# Patient Record
Sex: FEMALE | Race: WHITE
[De-identification: names, ages, dates, MRNs, and addresses within clinical notes are randomized per-mention and may not be internally consistent; named-entity substitution may affect disease eponyms.]

---

## 2022-12-06 ENCOUNTER — OFFICE VISIT (OUTPATIENT)
Dept: FAMILY MEDICINE CLINIC | Facility: CLINIC | Age: 32
End: 2022-12-06
Payer: COMMERCIAL

## 2022-12-06 VITALS
OXYGEN SATURATION: 99 % | WEIGHT: 146.2 LBS | BODY MASS INDEX: 26.91 KG/M2 | DIASTOLIC BLOOD PRESSURE: 80 MMHG | HEIGHT: 62 IN | SYSTOLIC BLOOD PRESSURE: 120 MMHG | HEART RATE: 99 BPM

## 2022-12-06 DIAGNOSIS — F41.9 ANXIETY: Primary | ICD-10-CM

## 2022-12-06 DIAGNOSIS — F33.9 RECURRENT DEPRESSION (HCC): ICD-10-CM

## 2022-12-06 PROCEDURE — 99213 OFFICE O/P EST LOW 20 MIN: CPT | Performed by: FAMILY MEDICINE

## 2022-12-06 RX ORDER — NORGESTIMATE AND ETHINYL ESTRADIOL 0.25-0.035
1 KIT ORAL DAILY
COMMUNITY
Start: 2022-04-27

## 2022-12-06 ASSESSMENT — PATIENT HEALTH QUESTIONNAIRE - PHQ9: DEPRESSION UNABLE TO ASSESS: FUNCTIONAL CAPACITY MOTIVATION LIMITS ACCURACY

## 2022-12-06 NOTE — PROGRESS NOTES
SUBJECTIVE:   Rdaha Mayo is a 29 y.o. female who has a past medical history significant for recurrent depression and anxiety. Previous visit in May we weaned her off of her Zoloft. She is doing well off of it. She would like to wean off of her Wellbutrin if possible. Patient is  and very happy. She reports that a lot of her issues that were situational related to her stress and anxiety have resolved. She has a new job and is very happy in this role as well. HPI  See above    Past Medical History, Past Surgical History, Family history, Social History, and Medications were all reviewed with the patient today and updated as necessary. Current Outpatient Medications   Medication Sig Dispense Refill    norgestimate-ethinyl estradiol (ORTHO-CYCLEN) 0.25-35 MG-MCG per tablet Take 1 tablet by mouth daily      buPROPion (WELLBUTRIN XL) 150 MG extended release tablet Take 150 mg by mouth       No current facility-administered medications for this visit. Allergies   Allergen Reactions    Cefprozil Hives    Levofloxacin Hives     There is no problem list on file for this patient. Past Medical History:   Diagnosis Date    Anxiety     Depression     Insomnia      No past surgical history on file. Family History   Problem Relation Age of Onset    Diabetes Father      Social History     Tobacco Use    Smoking status: Never    Smokeless tobacco: Never   Substance Use Topics    Alcohol use: Yes     Alcohol/week: 0.0 standard drinks         Review of Systems  See above    OBJECTIVE:  /80   Pulse 99   Ht 5' 2\" (1.575 m)   Wt 146 lb 3.2 oz (66.3 kg)   SpO2 99%   BMI 26.74 kg/m²      Physical Exam  Constitutional:       General: She is not in acute distress. Appearance: Normal appearance. She is not ill-appearing. HENT:      Head: Normocephalic and atraumatic. Cardiovascular:      Rate and Rhythm: Normal rate and regular rhythm. Heart sounds: Normal heart sounds.  No murmur heard. Pulmonary:      Effort: Pulmonary effort is normal.      Breath sounds: Normal breath sounds. No wheezing or rhonchi. Musculoskeletal:         General: Normal range of motion. Cervical back: Normal range of motion and neck supple. Right lower leg: No edema. Left lower leg: No edema. Skin:     General: Skin is warm. Findings: No rash. Neurological:      Mental Status: She is alert and oriented to person, place, and time. Psychiatric:         Mood and Affect: Mood normal.         Behavior: Behavior normal.         Thought Content: Thought content normal.         Judgment: Judgment normal.       Medical problems and test results were reviewed with the patient today. ASSESSMENT and PLAN    1. Anxiety. Wean off Wellbutrin every other day for a week and every third day for a week and then stop. If she has any issues she will let me know. 2.  Recurrent depression. As above. Patient will monitor for any breakthrough rebound symptoms. Congratulated her on her improvement and she understands that if she needs anything she can reach out anytime. Elements of this note have been dictated using speech recognition software. As a result, errors of speech recognition may have occurred.

## 2023-01-30 RX ORDER — BUPROPION HYDROCHLORIDE 150 MG/1
TABLET ORAL
Qty: 90 TABLET | OUTPATIENT
Start: 2023-01-30

## 2023-10-02 ENCOUNTER — NURSE ONLY (OUTPATIENT)
Dept: FAMILY MEDICINE CLINIC | Facility: CLINIC | Age: 33
End: 2023-10-02
Payer: COMMERCIAL

## 2023-10-02 DIAGNOSIS — Z00.00 LABORATORY EXAMINATION ORDERED AS PART OF A ROUTINE GENERAL MEDICAL EXAMINATION: Primary | ICD-10-CM

## 2023-10-02 DIAGNOSIS — Z11.59 NEED FOR HEPATITIS C SCREENING TEST: ICD-10-CM

## 2023-10-02 LAB
ALBUMIN SERPL-MCNC: 3.6 G/DL (ref 3.5–5)
ALBUMIN/GLOB SERPL: 1.1 (ref 0.4–1.6)
ALP SERPL-CCNC: 43 U/L (ref 50–136)
ALT SERPL-CCNC: 29 U/L (ref 12–65)
ANION GAP SERPL CALC-SCNC: 6 MMOL/L (ref 2–11)
AST SERPL-CCNC: 17 U/L (ref 15–37)
BILIRUB SERPL-MCNC: 0.5 MG/DL (ref 0.2–1.1)
BUN SERPL-MCNC: 10 MG/DL (ref 6–23)
CALCIUM SERPL-MCNC: 8.4 MG/DL (ref 8.3–10.4)
CHLORIDE SERPL-SCNC: 108 MMOL/L (ref 101–110)
CHOLEST SERPL-MCNC: 240 MG/DL
CO2 SERPL-SCNC: 24 MMOL/L (ref 21–32)
CREAT SERPL-MCNC: 0.8 MG/DL (ref 0.6–1)
GLOBULIN SER CALC-MCNC: 3.4 G/DL (ref 2.8–4.5)
GLUCOSE SERPL-MCNC: 95 MG/DL (ref 65–100)
GRANS ABSOLUTE, POC: 3.9 K/UL
GRANULOCYTES %, POC: 61.3 %
HCV AB SER QL: NONREACTIVE
HDLC SERPL-MCNC: 74 MG/DL (ref 40–60)
HDLC SERPL: 3.2
HEMATOCRIT, POC: 42.9 %
HEMOGLOBIN, POC: 13.9 G/DL
LDLC SERPL CALC-MCNC: 142 MG/DL
LYMPHOCYTE %, POC: 30.3 %
LYMPHS ABSOLUTE, POC: 1.9 K/UL
MCH, POC: NORMAL PG (ref 40–?)
MCHC, POC: 32.4
MCV, POC: 96.9
MONOCYTE %, POC: 8.4 %
MONOCYTE, ABSOLUTE POC: 0.5 K/UL
MPV, POC: 7.4 FL
PLATELET COUNT, POC: 319 K/UL
POTASSIUM SERPL-SCNC: 4.4 MMOL/L (ref 3.5–5.1)
PROT SERPL-MCNC: 7 G/DL (ref 6.3–8.2)
RBC, POC: 4.31 M/UL
RDW, POC: 12.7 %
SODIUM SERPL-SCNC: 138 MMOL/L (ref 133–143)
TRIGL SERPL-MCNC: 120 MG/DL (ref 35–150)
TSH, 3RD GENERATION: 1.76 UIU/ML (ref 0.36–3.74)
VLDLC SERPL CALC-MCNC: 24 MG/DL (ref 6–23)
WBC, POC: 6.4 K/UL

## 2023-10-02 PROCEDURE — 85025 COMPLETE CBC W/AUTO DIFF WBC: CPT | Performed by: FAMILY MEDICINE

## 2023-10-02 ASSESSMENT — PATIENT HEALTH QUESTIONNAIRE - PHQ9
7. TROUBLE CONCENTRATING ON THINGS, SUCH AS READING THE NEWSPAPER OR WATCHING TELEVISION: 2
5. POOR APPETITE OR OVEREATING: NOT AT ALL
1. LITTLE INTEREST OR PLEASURE IN DOING THINGS: 0
8. MOVING OR SPEAKING SO SLOWLY THAT OTHER PEOPLE COULD HAVE NOTICED. OR THE OPPOSITE - BEING SO FIDGETY OR RESTLESS THAT YOU HAVE BEEN MOVING AROUND A LOT MORE THAN USUAL: NOT AT ALL
SUM OF ALL RESPONSES TO PHQ QUESTIONS 1-9: 3
4. FEELING TIRED OR HAVING LITTLE ENERGY: SEVERAL DAYS
2. FEELING DOWN, DEPRESSED OR HOPELESS: NOT AT ALL
3. TROUBLE FALLING OR STAYING ASLEEP: NOT AT ALL
5. POOR APPETITE OR OVEREATING: 0
4. FEELING TIRED OR HAVING LITTLE ENERGY: 1
SUM OF ALL RESPONSES TO PHQ QUESTIONS 1-9: 3
9. THOUGHTS THAT YOU WOULD BE BETTER OFF DEAD, OR OF HURTING YOURSELF: 0
1. LITTLE INTEREST OR PLEASURE IN DOING THINGS: NOT AT ALL
10. IF YOU CHECKED OFF ANY PROBLEMS, HOW DIFFICULT HAVE THESE PROBLEMS MADE IT FOR YOU TO DO YOUR WORK, TAKE CARE OF THINGS AT HOME, OR GET ALONG WITH OTHER PEOPLE: SOMEWHAT DIFFICULT
SUM OF ALL RESPONSES TO PHQ QUESTIONS 1-9: 3
SUM OF ALL RESPONSES TO PHQ QUESTIONS 1-9: 3
7. TROUBLE CONCENTRATING ON THINGS, SUCH AS READING THE NEWSPAPER OR WATCHING TELEVISION: MORE THAN HALF THE DAYS
6. FEELING BAD ABOUT YOURSELF - OR THAT YOU ARE A FAILURE OR HAVE LET YOURSELF OR YOUR FAMILY DOWN: 0
SUM OF ALL RESPONSES TO PHQ9 QUESTIONS 1 & 2: 0
3. TROUBLE FALLING OR STAYING ASLEEP: 0
6. FEELING BAD ABOUT YOURSELF - OR THAT YOU ARE A FAILURE OR HAVE LET YOURSELF OR YOUR FAMILY DOWN: NOT AT ALL
9. THOUGHTS THAT YOU WOULD BE BETTER OFF DEAD, OR OF HURTING YOURSELF: NOT AT ALL
8. MOVING OR SPEAKING SO SLOWLY THAT OTHER PEOPLE COULD HAVE NOTICED. OR THE OPPOSITE, BEING SO FIGETY OR RESTLESS THAT YOU HAVE BEEN MOVING AROUND A LOT MORE THAN USUAL: 0
10. IF YOU CHECKED OFF ANY PROBLEMS, HOW DIFFICULT HAVE THESE PROBLEMS MADE IT FOR YOU TO DO YOUR WORK, TAKE CARE OF THINGS AT HOME, OR GET ALONG WITH OTHER PEOPLE: 1
2. FEELING DOWN, DEPRESSED OR HOPELESS: 0
SUM OF ALL RESPONSES TO PHQ QUESTIONS 1-9: 3

## 2023-10-09 ENCOUNTER — OFFICE VISIT (OUTPATIENT)
Dept: FAMILY MEDICINE CLINIC | Facility: CLINIC | Age: 33
End: 2023-10-09
Payer: COMMERCIAL

## 2023-10-09 VITALS
WEIGHT: 148.8 LBS | DIASTOLIC BLOOD PRESSURE: 86 MMHG | OXYGEN SATURATION: 99 % | SYSTOLIC BLOOD PRESSURE: 122 MMHG | BODY MASS INDEX: 27.38 KG/M2 | HEIGHT: 62 IN | HEART RATE: 103 BPM

## 2023-10-09 DIAGNOSIS — E78.00 PURE HYPERCHOLESTEROLEMIA: ICD-10-CM

## 2023-10-09 DIAGNOSIS — Z00.00 ROUTINE GENERAL MEDICAL EXAMINATION AT A HEALTH CARE FACILITY: Primary | ICD-10-CM

## 2023-10-09 DIAGNOSIS — F33.9 RECURRENT DEPRESSION (HCC): ICD-10-CM

## 2023-10-09 DIAGNOSIS — F41.9 ANXIETY: ICD-10-CM

## 2023-10-09 PROCEDURE — 99395 PREV VISIT EST AGE 18-39: CPT | Performed by: FAMILY MEDICINE

## 2023-10-09 RX ORDER — NORETHINDRONE AND ETHINYL ESTRADIOL 0.5-0.035
KIT ORAL
COMMUNITY
Start: 2023-09-16

## 2023-10-09 SDOH — ECONOMIC STABILITY: FOOD INSECURITY: WITHIN THE PAST 12 MONTHS, THE FOOD YOU BOUGHT JUST DIDN'T LAST AND YOU DIDN'T HAVE MONEY TO GET MORE.: NEVER TRUE

## 2023-10-09 SDOH — ECONOMIC STABILITY: HOUSING INSECURITY
IN THE LAST 12 MONTHS, WAS THERE A TIME WHEN YOU DID NOT HAVE A STEADY PLACE TO SLEEP OR SLEPT IN A SHELTER (INCLUDING NOW)?: NO

## 2023-10-09 SDOH — ECONOMIC STABILITY: INCOME INSECURITY: HOW HARD IS IT FOR YOU TO PAY FOR THE VERY BASICS LIKE FOOD, HOUSING, MEDICAL CARE, AND HEATING?: NOT HARD AT ALL

## 2023-10-09 SDOH — ECONOMIC STABILITY: FOOD INSECURITY: WITHIN THE PAST 12 MONTHS, YOU WORRIED THAT YOUR FOOD WOULD RUN OUT BEFORE YOU GOT MONEY TO BUY MORE.: NEVER TRUE

## 2023-10-10 ENCOUNTER — TRANSCRIBE ORDERS (OUTPATIENT)
Dept: SCHEDULING | Age: 33
End: 2023-10-10

## 2023-10-10 DIAGNOSIS — N63.21 UNSPECIFIED LUMP IN THE LEFT BREAST, UPPER OUTER QUADRANT: ICD-10-CM

## 2023-10-10 DIAGNOSIS — N63.10 MASS OF RIGHT BREAST, UNSPECIFIED QUADRANT: Primary | ICD-10-CM

## 2023-10-20 ENCOUNTER — HOSPITAL ENCOUNTER (OUTPATIENT)
Dept: MAMMOGRAPHY | Age: 33
Discharge: HOME | End: 2023-10-20
Payer: COMMERCIAL

## 2023-10-20 ENCOUNTER — HOSPITAL ENCOUNTER (OUTPATIENT)
Dept: MAMMOGRAPHY | Age: 33
End: 2023-10-20
Payer: COMMERCIAL

## 2023-10-20 DIAGNOSIS — N63.21 UNSPECIFIED LUMP IN THE LEFT BREAST, UPPER OUTER QUADRANT: ICD-10-CM

## 2023-10-20 DIAGNOSIS — N63.0 LUMP IN FEMALE BREAST: ICD-10-CM

## 2023-10-20 DIAGNOSIS — N63.10 MASS OF RIGHT BREAST, UNSPECIFIED QUADRANT: ICD-10-CM

## 2023-10-20 PROCEDURE — G0279 TOMOSYNTHESIS, MAMMO: HCPCS

## 2023-10-20 PROCEDURE — 76642 ULTRASOUND BREAST LIMITED: CPT

## 2024-09-15 ENCOUNTER — PREP FOR PROCEDURE (OUTPATIENT)
Dept: OBGYN | Age: 34
End: 2024-09-15

## 2024-09-15 RX ORDER — CARBOPROST TROMETHAMINE 250 UG/ML
250 INJECTION, SOLUTION INTRAMUSCULAR PRN
Status: CANCELLED | OUTPATIENT
Start: 2024-09-15

## 2024-09-15 RX ORDER — TERBUTALINE SULFATE 1 MG/ML
0.25 INJECTION, SOLUTION SUBCUTANEOUS
Status: CANCELLED | OUTPATIENT
Start: 2024-09-15 | End: 2024-09-16

## 2024-09-15 RX ORDER — TRANEXAMIC ACID 10 MG/ML
1000 INJECTION, SOLUTION INTRAVENOUS
Status: CANCELLED | OUTPATIENT
Start: 2024-09-15 | End: 2024-09-16

## 2024-09-15 RX ORDER — SODIUM CHLORIDE, SODIUM LACTATE, POTASSIUM CHLORIDE, CALCIUM CHLORIDE 600; 310; 30; 20 MG/100ML; MG/100ML; MG/100ML; MG/100ML
INJECTION, SOLUTION INTRAVENOUS CONTINUOUS
Status: CANCELLED | OUTPATIENT
Start: 2024-09-15

## 2024-09-15 RX ORDER — SODIUM CHLORIDE, SODIUM LACTATE, POTASSIUM CHLORIDE, AND CALCIUM CHLORIDE .6; .31; .03; .02 G/100ML; G/100ML; G/100ML; G/100ML
500 INJECTION, SOLUTION INTRAVENOUS PRN
Status: CANCELLED | OUTPATIENT
Start: 2024-09-15

## 2024-09-15 RX ORDER — ONDANSETRON 4 MG/1
4 TABLET, ORALLY DISINTEGRATING ORAL EVERY 6 HOURS PRN
Status: CANCELLED | OUTPATIENT
Start: 2024-09-15

## 2024-09-15 RX ORDER — DOCUSATE SODIUM 100 MG/1
100 CAPSULE, LIQUID FILLED ORAL 2 TIMES DAILY
Status: CANCELLED | OUTPATIENT
Start: 2024-09-15

## 2024-09-15 RX ORDER — SODIUM CHLORIDE 9 MG/ML
INJECTION, SOLUTION INTRAVENOUS PRN
Status: CANCELLED | OUTPATIENT
Start: 2024-09-15

## 2024-09-15 RX ORDER — MISOPROSTOL 200 UG/1
400 TABLET ORAL PRN
Status: CANCELLED | OUTPATIENT
Start: 2024-09-15

## 2024-09-15 RX ORDER — SODIUM CHLORIDE 0.9 % (FLUSH) 0.9 %
5-40 SYRINGE (ML) INJECTION PRN
Status: CANCELLED | OUTPATIENT
Start: 2024-09-15

## 2024-09-15 RX ORDER — SODIUM CHLORIDE 0.9 % (FLUSH) 0.9 %
5-40 SYRINGE (ML) INJECTION EVERY 12 HOURS SCHEDULED
Status: CANCELLED | OUTPATIENT
Start: 2024-09-15

## 2024-09-15 RX ORDER — BUTORPHANOL TARTRATE 1 MG/ML
1 INJECTION, SOLUTION INTRAMUSCULAR; INTRAVENOUS
Status: CANCELLED | OUTPATIENT
Start: 2024-09-15

## 2024-09-15 RX ORDER — SODIUM CHLORIDE, SODIUM LACTATE, POTASSIUM CHLORIDE, AND CALCIUM CHLORIDE .6; .31; .03; .02 G/100ML; G/100ML; G/100ML; G/100ML
1000 INJECTION, SOLUTION INTRAVENOUS PRN
Status: CANCELLED | OUTPATIENT
Start: 2024-09-15

## 2024-09-15 RX ORDER — ONDANSETRON 2 MG/ML
4 INJECTION INTRAMUSCULAR; INTRAVENOUS EVERY 6 HOURS PRN
Status: CANCELLED | OUTPATIENT
Start: 2024-09-15

## 2024-09-15 RX ORDER — METHYLERGONOVINE MALEATE 0.2 MG/ML
200 INJECTION INTRAVENOUS PRN
Status: CANCELLED | OUTPATIENT
Start: 2024-09-15

## 2024-10-01 ENCOUNTER — HOSPITAL ENCOUNTER (INPATIENT)
Age: 34
LOS: 3 days | Discharge: HOME | End: 2024-10-04
Attending: OBSTETRICS & GYNECOLOGY | Admitting: OBSTETRICS & GYNECOLOGY
Payer: COMMERCIAL

## 2024-10-01 DIAGNOSIS — Z3A.39 39 WEEKS GESTATION OF PREGNANCY: Primary | ICD-10-CM

## 2024-10-01 LAB
ABO + RH BLD: NORMAL
BLOOD GROUP ANTIBODIES SERPL: NORMAL
ERYTHROCYTE [DISTWIDTH] IN BLOOD BY AUTOMATED COUNT: 13.6 % (ref 11.9–14.6)
HCT VFR BLD AUTO: 38.2 % (ref 35.8–46.3)
HGB BLD-MCNC: 12.4 G/DL (ref 11.7–15.4)
MCH RBC QN AUTO: 30.5 PG (ref 26.1–32.9)
MCHC RBC AUTO-ENTMCNC: 32.5 G/DL (ref 31.4–35)
MCV RBC AUTO: 94.1 FL (ref 82–102)
NRBC # BLD: 0 K/UL (ref 0–0.2)
PLATELET # BLD AUTO: 176 K/UL (ref 150–450)
PMV BLD AUTO: 11 FL (ref 9.4–12.3)
RBC # BLD AUTO: 4.06 M/UL (ref 4.05–5.2)
SPECIMEN EXP DATE BLD: NORMAL
WBC # BLD AUTO: 8.1 K/UL (ref 4.3–11.1)

## 2024-10-01 PROCEDURE — 36415 COLL VENOUS BLD VENIPUNCTURE: CPT

## 2024-10-01 PROCEDURE — 85027 COMPLETE CBC AUTOMATED: CPT

## 2024-10-01 PROCEDURE — 6370000000 HC RX 637 (ALT 250 FOR IP): Performed by: OBSTETRICS & GYNECOLOGY

## 2024-10-01 PROCEDURE — 86901 BLOOD TYPING SEROLOGIC RH(D): CPT

## 2024-10-01 PROCEDURE — 86780 TREPONEMA PALLIDUM: CPT

## 2024-10-01 PROCEDURE — 86900 BLOOD TYPING SEROLOGIC ABO: CPT

## 2024-10-01 PROCEDURE — 1100000000 HC RM PRIVATE

## 2024-10-01 PROCEDURE — 86850 RBC ANTIBODY SCREEN: CPT

## 2024-10-01 PROCEDURE — 59200 INSERT CERVICAL DILATOR: CPT

## 2024-10-01 RX ORDER — ONDANSETRON 2 MG/ML
4 INJECTION INTRAMUSCULAR; INTRAVENOUS EVERY 6 HOURS PRN
Status: DISCONTINUED | OUTPATIENT
Start: 2024-10-01 | End: 2024-10-02

## 2024-10-01 RX ORDER — SODIUM CHLORIDE 0.9 % (FLUSH) 0.9 %
5-40 SYRINGE (ML) INJECTION PRN
Status: DISCONTINUED | OUTPATIENT
Start: 2024-10-01 | End: 2024-10-02

## 2024-10-01 RX ORDER — ONDANSETRON 4 MG/1
4 TABLET, ORALLY DISINTEGRATING ORAL EVERY 6 HOURS PRN
Status: DISCONTINUED | OUTPATIENT
Start: 2024-10-01 | End: 2024-10-02

## 2024-10-01 RX ORDER — METHYLERGONOVINE MALEATE 0.2 MG/ML
200 INJECTION INTRAVENOUS PRN
Status: DISCONTINUED | OUTPATIENT
Start: 2024-10-01 | End: 2024-10-02

## 2024-10-01 RX ORDER — SODIUM CHLORIDE, SODIUM LACTATE, POTASSIUM CHLORIDE, AND CALCIUM CHLORIDE .6; .31; .03; .02 G/100ML; G/100ML; G/100ML; G/100ML
500 INJECTION, SOLUTION INTRAVENOUS PRN
Status: DISCONTINUED | OUTPATIENT
Start: 2024-10-01 | End: 2024-10-02

## 2024-10-01 RX ORDER — TERBUTALINE SULFATE 1 MG/ML
0.25 INJECTION, SOLUTION SUBCUTANEOUS
Status: DISCONTINUED | OUTPATIENT
Start: 2024-10-01 | End: 2024-10-02

## 2024-10-01 RX ORDER — DOCUSATE SODIUM 100 MG/1
100 CAPSULE, LIQUID FILLED ORAL 2 TIMES DAILY
Status: DISCONTINUED | OUTPATIENT
Start: 2024-10-01 | End: 2024-10-02

## 2024-10-01 RX ORDER — CARBOPROST TROMETHAMINE 250 UG/ML
250 INJECTION, SOLUTION INTRAMUSCULAR PRN
Status: DISCONTINUED | OUTPATIENT
Start: 2024-10-01 | End: 2024-10-02

## 2024-10-01 RX ORDER — SODIUM CHLORIDE, SODIUM LACTATE, POTASSIUM CHLORIDE, CALCIUM CHLORIDE 600; 310; 30; 20 MG/100ML; MG/100ML; MG/100ML; MG/100ML
INJECTION, SOLUTION INTRAVENOUS CONTINUOUS
Status: DISCONTINUED | OUTPATIENT
Start: 2024-10-01 | End: 2024-10-02

## 2024-10-01 RX ORDER — SODIUM CHLORIDE 9 MG/ML
INJECTION, SOLUTION INTRAVENOUS PRN
Status: DISCONTINUED | OUTPATIENT
Start: 2024-10-01 | End: 2024-10-02

## 2024-10-01 RX ORDER — SODIUM CHLORIDE 0.9 % (FLUSH) 0.9 %
5-40 SYRINGE (ML) INJECTION EVERY 12 HOURS SCHEDULED
Status: DISCONTINUED | OUTPATIENT
Start: 2024-10-01 | End: 2024-10-02

## 2024-10-01 RX ORDER — TRANEXAMIC ACID 10 MG/ML
1000 INJECTION, SOLUTION INTRAVENOUS
Status: DISCONTINUED | OUTPATIENT
Start: 2024-10-01 | End: 2024-10-02

## 2024-10-01 RX ORDER — SODIUM CHLORIDE, SODIUM LACTATE, POTASSIUM CHLORIDE, AND CALCIUM CHLORIDE .6; .31; .03; .02 G/100ML; G/100ML; G/100ML; G/100ML
1000 INJECTION, SOLUTION INTRAVENOUS PRN
Status: DISCONTINUED | OUTPATIENT
Start: 2024-10-01 | End: 2024-10-02

## 2024-10-01 RX ADMIN — Medication 50 MCG: at 20:41

## 2024-10-02 ENCOUNTER — ANESTHESIA (OUTPATIENT)
Dept: LABOR AND DELIVERY | Age: 34
End: 2024-10-02
Payer: COMMERCIAL

## 2024-10-02 ENCOUNTER — ANESTHESIA EVENT (OUTPATIENT)
Dept: LABOR AND DELIVERY | Age: 34
End: 2024-10-02
Payer: COMMERCIAL

## 2024-10-02 PROCEDURE — 0KQM0ZZ REPAIR PERINEUM MUSCLE, OPEN APPROACH: ICD-10-PCS | Performed by: OBSTETRICS & GYNECOLOGY

## 2024-10-02 PROCEDURE — 10907ZC DRAINAGE OF AMNIOTIC FLUID, THERAPEUTIC FROM PRODUCTS OF CONCEPTION, VIA NATURAL OR ARTIFICIAL OPENING: ICD-10-PCS | Performed by: OBSTETRICS & GYNECOLOGY

## 2024-10-02 PROCEDURE — 62325 NJX INTERLAMINAR CRV/THRC: CPT | Performed by: ANESTHESIOLOGY

## 2024-10-02 PROCEDURE — 7210000100 HC LABOR FEE PER 1 HR

## 2024-10-02 PROCEDURE — 7220000101 HC DELIVERY VAGINAL/SINGLE

## 2024-10-02 PROCEDURE — 00HU33Z INSERTION OF INFUSION DEVICE INTO SPINAL CANAL, PERCUTANEOUS APPROACH: ICD-10-PCS | Performed by: ANESTHESIOLOGY

## 2024-10-02 PROCEDURE — 2580000003 HC RX 258: Performed by: OBSTETRICS & GYNECOLOGY

## 2024-10-02 PROCEDURE — 7100000011 HC PHASE II RECOVERY - ADDTL 15 MIN

## 2024-10-02 PROCEDURE — 6370000000 HC RX 637 (ALT 250 FOR IP): Performed by: OBSTETRICS & GYNECOLOGY

## 2024-10-02 PROCEDURE — 51701 INSERT BLADDER CATHETER: CPT

## 2024-10-02 PROCEDURE — 6360000002 HC RX W HCPCS: Performed by: OBSTETRICS & GYNECOLOGY

## 2024-10-02 PROCEDURE — 7100000010 HC PHASE II RECOVERY - FIRST 15 MIN

## 2024-10-02 PROCEDURE — 1100000000 HC RM PRIVATE

## 2024-10-02 PROCEDURE — 0UQMXZZ REPAIR VULVA, EXTERNAL APPROACH: ICD-10-PCS | Performed by: OBSTETRICS & GYNECOLOGY

## 2024-10-02 PROCEDURE — 3E033VJ INTRODUCTION OF OTHER HORMONE INTO PERIPHERAL VEIN, PERCUTANEOUS APPROACH: ICD-10-PCS | Performed by: OBSTETRICS & GYNECOLOGY

## 2024-10-02 RX ORDER — ROPIVACAINE HYDROCHLORIDE 2 MG/ML
INJECTION, SOLUTION EPIDURAL; INFILTRATION; PERINEURAL
Status: DISCONTINUED | OUTPATIENT
Start: 2024-10-02 | End: 2024-10-02 | Stop reason: SDUPTHER

## 2024-10-02 RX ORDER — OXYCODONE HYDROCHLORIDE 5 MG/1
10 TABLET ORAL EVERY 4 HOURS PRN
Status: DISCONTINUED | OUTPATIENT
Start: 2024-10-02 | End: 2024-10-04 | Stop reason: HOSPADM

## 2024-10-02 RX ORDER — IBUPROFEN 800 MG/1
800 TABLET, FILM COATED ORAL EVERY 6 HOURS
Status: DISCONTINUED | OUTPATIENT
Start: 2024-10-02 | End: 2024-10-04 | Stop reason: HOSPADM

## 2024-10-02 RX ORDER — DOCUSATE SODIUM 100 MG/1
100 CAPSULE, LIQUID FILLED ORAL 2 TIMES DAILY PRN
Status: DISCONTINUED | OUTPATIENT
Start: 2024-10-02 | End: 2024-10-04 | Stop reason: HOSPADM

## 2024-10-02 RX ORDER — OXYCODONE HYDROCHLORIDE 5 MG/1
5 TABLET ORAL EVERY 4 HOURS PRN
Status: DISCONTINUED | OUTPATIENT
Start: 2024-10-02 | End: 2024-10-04 | Stop reason: HOSPADM

## 2024-10-02 RX ORDER — SODIUM CHLORIDE 9 MG/ML
INJECTION, SOLUTION INTRAVENOUS PRN
Status: DISCONTINUED | OUTPATIENT
Start: 2024-10-02 | End: 2024-10-03

## 2024-10-02 RX ORDER — SIMETHICONE 80 MG
80 TABLET,CHEWABLE ORAL EVERY 6 HOURS PRN
Status: DISCONTINUED | OUTPATIENT
Start: 2024-10-02 | End: 2024-10-04 | Stop reason: HOSPADM

## 2024-10-02 RX ORDER — SODIUM CHLORIDE 0.9 % (FLUSH) 0.9 %
5-40 SYRINGE (ML) INJECTION EVERY 12 HOURS SCHEDULED
Status: DISCONTINUED | OUTPATIENT
Start: 2024-10-02 | End: 2024-10-03

## 2024-10-02 RX ORDER — FAMOTIDINE 20 MG/1
20 TABLET, FILM COATED ORAL 2 TIMES DAILY
Status: DISCONTINUED | OUTPATIENT
Start: 2024-10-02 | End: 2024-10-04 | Stop reason: HOSPADM

## 2024-10-02 RX ORDER — ACETAMINOPHEN 500 MG
1000 TABLET ORAL EVERY 6 HOURS
Status: DISCONTINUED | OUTPATIENT
Start: 2024-10-02 | End: 2024-10-04 | Stop reason: HOSPADM

## 2024-10-02 RX ORDER — ONDANSETRON 4 MG/1
4 TABLET, ORALLY DISINTEGRATING ORAL EVERY 6 HOURS PRN
Status: DISCONTINUED | OUTPATIENT
Start: 2024-10-02 | End: 2024-10-04 | Stop reason: HOSPADM

## 2024-10-02 RX ORDER — SODIUM CHLORIDE 0.9 % (FLUSH) 0.9 %
5-40 SYRINGE (ML) INJECTION PRN
Status: DISCONTINUED | OUTPATIENT
Start: 2024-10-02 | End: 2024-10-03

## 2024-10-02 RX ORDER — LANOLIN
CREAM (ML) TOPICAL PRN
Status: DISCONTINUED | OUTPATIENT
Start: 2024-10-02 | End: 2024-10-04 | Stop reason: HOSPADM

## 2024-10-02 RX ORDER — POLYETHYLENE GLYCOL 3350 17 G/17G
17 POWDER, FOR SOLUTION ORAL DAILY
Status: DISCONTINUED | OUTPATIENT
Start: 2024-10-02 | End: 2024-10-04 | Stop reason: HOSPADM

## 2024-10-02 RX ORDER — SODIUM CHLORIDE, SODIUM LACTATE, POTASSIUM CHLORIDE, CALCIUM CHLORIDE 600; 310; 30; 20 MG/100ML; MG/100ML; MG/100ML; MG/100ML
INJECTION, SOLUTION INTRAVENOUS CONTINUOUS
Status: DISCONTINUED | OUTPATIENT
Start: 2024-10-02 | End: 2024-10-03

## 2024-10-02 RX ORDER — ONDANSETRON 2 MG/ML
4 INJECTION INTRAMUSCULAR; INTRAVENOUS EVERY 6 HOURS PRN
Status: DISCONTINUED | OUTPATIENT
Start: 2024-10-02 | End: 2024-10-03

## 2024-10-02 RX ORDER — MAGNESIUM CARB/ALUMINUM HYDROX 105-160MG
120 TABLET,CHEWABLE ORAL DAILY PRN
Status: DISCONTINUED | OUTPATIENT
Start: 2024-10-02 | End: 2024-10-02

## 2024-10-02 RX ADMIN — OXYTOCIN 87.3 MILLI-UNITS/MIN: 10 INJECTION, SOLUTION INTRAMUSCULAR; INTRAVENOUS at 17:08

## 2024-10-02 RX ADMIN — Medication: at 19:56

## 2024-10-02 RX ADMIN — SODIUM CHLORIDE, POTASSIUM CHLORIDE, SODIUM LACTATE AND CALCIUM CHLORIDE 1000 ML: 600; 310; 30; 20 INJECTION, SOLUTION INTRAVENOUS at 12:00

## 2024-10-02 RX ADMIN — POLYETHYLENE GLYCOL 3350 17 G: 17 POWDER, FOR SOLUTION ORAL at 19:55

## 2024-10-02 RX ADMIN — ROPIVACAINE HYDROCHLORIDE 8 ML/HR: 2 INJECTION, SOLUTION EPIDURAL; INFILTRATION; PERINEURAL at 12:20

## 2024-10-02 RX ADMIN — ACETAMINOPHEN 1000 MG: 500 TABLET, FILM COATED ORAL at 19:55

## 2024-10-02 RX ADMIN — OXYTOCIN 2 MILLI-UNITS/MIN: 10 INJECTION, SOLUTION INTRAMUSCULAR; INTRAVENOUS at 08:43

## 2024-10-02 RX ADMIN — ONDANSETRON 4 MG: 2 INJECTION, SOLUTION INTRAMUSCULAR; INTRAVENOUS at 10:58

## 2024-10-02 RX ADMIN — WITCH HAZEL: 500 SOLUTION RECTAL; TOPICAL at 19:56

## 2024-10-02 RX ADMIN — SODIUM CHLORIDE, POTASSIUM CHLORIDE, SODIUM LACTATE AND CALCIUM CHLORIDE: 600; 310; 30; 20 INJECTION, SOLUTION INTRAVENOUS at 08:33

## 2024-10-02 RX ADMIN — IBUPROFEN 800 MG: 800 TABLET, FILM COATED ORAL at 23:06

## 2024-10-02 RX ADMIN — Medication 50 MCG: at 00:51

## 2024-10-02 RX ADMIN — ROPIVACAINE HYDROCHLORIDE 4 ML: 2 INJECTION, SOLUTION EPIDURAL; INFILTRATION; PERINEURAL at 12:17

## 2024-10-02 RX ADMIN — SODIUM CHLORIDE, POTASSIUM CHLORIDE, SODIUM LACTATE AND CALCIUM CHLORIDE: 600; 310; 30; 20 INJECTION, SOLUTION INTRAVENOUS at 16:32

## 2024-10-02 RX ADMIN — Medication 120 ML: at 16:02

## 2024-10-02 RX ADMIN — FAMOTIDINE 20 MG: 20 TABLET, FILM COATED ORAL at 19:55

## 2024-10-02 RX ADMIN — ROPIVACAINE HYDROCHLORIDE 4 ML: 2 INJECTION, SOLUTION EPIDURAL; INFILTRATION; PERINEURAL at 12:18

## 2024-10-02 RX ADMIN — Medication 50 MCG: at 04:42

## 2024-10-02 RX ADMIN — SODIUM CHLORIDE, PRESERVATIVE FREE 10 ML: 5 INJECTION INTRAVENOUS at 21:44

## 2024-10-02 ASSESSMENT — PAIN SCALES - GENERAL: PAINLEVEL_OUTOF10: 2

## 2024-10-02 NOTE — L&D DELIVERY NOTE
Samir Fernandes April [[id number]]      Labor Events     Labor: No   Steroids: None  Cervical Ripening Date/Time:      Antibiotics Received during Labor: No  Rupture Date/Time:  10/2/24 07:11:00   Rupture Type: AROM  Fluid Color: Clear  Fluid Odor: None  Induction: Misoprostol  Augmentation: AROM       Anesthesia    Method: Epidural       Labor Event Times      Labor onset date/time:        Dilation complete date/time:   15:35:00     Start pushing date/time:  10/2/2024 15:40:00   Decision date/time (emergent ):            Delivery Details      Delivery Date: 10/2/24 Delivery Time: 17:05:00   Delivery Type: Vaginal, Spontaneous              [place] Presentation    Presentation: Vertex  Position: Left  _: Occiput  _: Anterior       Shoulder Dystocia    Shoulder Dystocia Present?: No       Assisted Delivery Details    Forceps Attempted?: No  Vacuum Extractor Attempted?: No                           Cord    Vessels: 3 Vessels  Complications: Nuchal Loose  Cord Around: Head  Delayed Cord Clamping?: Yes  Cord Blood Disposition: Lab  Gases Sent?: No   Cord Comments:  PROCEDURAL - OBSTETRIC - CORD OBSERVATION   nuchal X1             Placenta    Date/Time: 10/2/2024 17:09:00  Removal: Spontaneous  Appearance: Intact  Disposition: Discarded       Lacerations    Episiotomy: None  Perineal Lacerations: 2nd  Other Lacerations: periurethral laceration  Periurethral Laceration: Midline Repaired?: Yes   Number of Repair Packets: 2       Vaginal Counts    Initial Count Personnel: KAYLEE  Initial Count Verified By: NAI  Intial Sponge Count: Correct Intial Needles Count: Correct Intial Instruments Count: Correct   Final Sponges Count: Correct Final Needles  Count: Correct Final Instruments Count: Correct   Final Count Personnel: KAYLEE  Final Count Verified By: NAI  Accurate Final Count?: Yes       Blood Loss  Mother: Radha Fernandes [facility] #[id number]     Start of Mother's Information      Delivery  Dom  SEX: female  DATE AND TIME OF BIRTH:   Information for the patient's :  Samir Fernandes April [[id number]]   10/2/2022 at   Information for the patient's :  Samir Fernandes April [[id number]]   [birth year]  BIRTH MEASUREMENTS:   Information for the patient's :  Samir Fernandes April [[id number]]     and   Information for the patient's :  Samir Fernandes April [[id number]]    .  APGARS:  Information for the patient's :  Samir Fernaneds April [[id number]]       Information for the patient's :  Samir Fernandes April [[id number]]        Umbilical Cord: Nuchal Cord x  1,left arm around neck also, 3 vessels present, and Cord blood sent to lab for type, Rh, and Wilma' test    Specimens: na           Complications:  none           Lab Results   Component Value Date/Time    ABORH O POSITIVE 10/01/2024 08:58 PM            Attending Attestation: I was present and scrubbed for the entire procedure.

## 2024-10-02 NOTE — PROGRESS NOTES
Pt ambulated to restroom. Pt unable to void at this time. Pt instructed in pericare and demonstrated understanding.

## 2024-10-02 NOTE — ANESTHESIA PROCEDURE NOTES
Epidural Block    Patient location during procedure: OB  Start time: 10/2/2024 12:09 PM  End time: 10/2/2024 12:15 PM  Reason for block: labor epidural and at surgeon's request  Staffing  Performed: anesthesiologist   Anesthesiologist: Ever Lyons MD  Performed by: Ever Lyons MD  Authorized by: Ever Lyons MD    Epidural  Patient position: sitting  Prep: ChloraPrep  Patient monitoring: frequent blood pressure checks  Approach: midline  Location: L3-4  Injection technique: DARREN saline  Provider prep: mask and sterile gloves  Needle  Needle type: Tuohy   Needle gauge: 17 G  Needle insertion depth: 5 cm  Catheter type: multi-orifice  Catheter size: 19 G  Catheter at skin depth: 8.5 cm  Test dose: negativeCatheter Secured: tegaderm and tape  Assessment  Hemodynamics: stable  Attempts: 1  Outcomes: patient tolerated procedure well and uncomplicated  Preanesthetic Checklist  Completed: patient identified, IV checked, site marked, risks and benefits discussed, surgical/procedural consents, equipment checked, pre-op evaluation, timeout performed, anesthesia consent given, oxygen available and monitors applied/VS acknowledged

## 2024-10-02 NOTE — ANESTHESIA PRE PROCEDURE
mg  0.25 mg SubCUTAneous Once PRN Naina Dunn MD       • butorphanol (STADOL) injection 1 mg  1 mg IntraVENous Q3H PRN Naina Dunn MD       • docusate sodium (COLACE) capsule 100 mg  100 mg Oral BID Naina Dunn MD       • oxytocin (PITOCIN) 30 units in 500 mL infusion  1-32 barrie-units/min IntraVENous Continuous Naina Dunn MD 14 mL/hr at 10/02/24 1153 14 barrie-units/min at 10/02/24 1153   • ondansetron (ZOFRAN) injection 4 mg  4 mg IntraVENous Q6H PRN Naina Dunn MD   4 mg at 10/02/24 1058    Or   • ondansetron (ZOFRAN-ODT) disintegrating tablet 4 mg  4 mg Oral Q6H PRN Naina Dunn MD         Facility-Administered Medications Ordered in Other Encounters   Medication Dose Route Frequency Provider Last Rate Last Admin   • ROPivacaine (NAROPIN) 0.2% injection 0.2%   Epidural Once PRN Sena Marin APRN - CRNA   8 mL/hr at 10/02/24 1220       Allergies:    Allergies   Allergen Reactions   • Cefprozil Hives     Pt states not allergy   • Levofloxacin Hives     Pt states not allergy       Problem List:    Patient Active Problem List   Diagnosis Code   • 39 weeks gestation of pregnancy Z3A.39       Past Medical History:        Diagnosis Date   • Anxiety    • Depression    • Insomnia        Past Surgical History:        Procedure Laterality Date   • FOOT SURGERY  08/2020   • TONSILLECTOMY AND ADENOIDECTOMY      17yo   • WISDOM TOOTH EXTRACTION      18yo       Social History:    Social History     Tobacco Use   • Smoking status: Never   • Smokeless tobacco: Never   Substance Use Topics   • Alcohol use: Yes     Alcohol/week: 0.0 standard drinks of alcohol                                Counseling given: Not Answered      Vital Signs (Current):   Vitals:    10/02/24 1015 10/02/24 1016 10/02/24 1107 10/02/24 1110   BP:  109/79  115/69   Pulse: 86 83 68 65   Resp:  17  18   Temp:  98.4 °F (36.9 °C)  98.6 °F (37 °C)   TempSrc:       SpO2: 95% 99% 99% 99%                                              BP

## 2024-10-02 NOTE — H&P
History & Physical    Name: Radha Fernandes  MRN: [id number]   SSN: [unfilled]     YOB: 1990   Age: 34 y.o.  Sex: female       Subjective:     Estimated Date of Delivery: Estimated Date of Delivery: 24     OB History    Para Term  AB Living   1             SAB IAB Ectopic Molar Multiple Live Births                    # Outcome Date GA Lbr Charlie/2nd Weight Sex Type Anes PTL Lv   1 Current                 April Amarilis Fernandes is admitted with pregnancy at 40w2d for induction of labor due to post dates. Prenatal course was normal.  Please see prenatal records for details.    Past Medical History:   Diagnosis Date    Anxiety     Depression     Insomnia       Social History     Socioeconomic History    Marital status:    Tobacco Use    Smoking status: Never    Smokeless tobacco: Never   Substance and Sexual Activity    Alcohol use: Yes     Alcohol/week: 0.0 standard drinks of alcohol     Social Determinants of Health     Financial Resource Strain: Low Risk  (10/9/2023)    Overall Financial Resource Strain (CARDIA)     Difficulty of Paying Living Expenses: Not hard at all   Food Insecurity: No Food Insecurity (10/1/2024)    Hunger Vital Sign     Worried About Running Out of Food in the Last Year: Never true     Ran Out of Food in the Last Year: Never true   Transportation Needs: No Transportation Needs (10/1/2024)    PRAPARE - Transportation     Lack of Transportation (Medical): No     Lack of Transportation (Non-Medical): No   Social Connections: Unknown (2023)    Received from [facility]    Social Connections     Frequency of Communication with Friends and Family: Not asked     Frequency of Social Gatherings with Friends and Family: Not asked   Intimate Partner Violence: Unknown (2023)    Received from [facility]    Intimate Partner Violence     Fear of Current or Ex-Partner: Not asked     Emotionally Abused: Not asked      Physically Abused: Not asked     Sexually Abused: Not asked   Housing Stability: Low Risk  (10/1/2024)    Housing Stability Vital Sign     Unable to Pay for Housing in the Last Year: No     Number of Times Moved in the Last Year: 1     Homeless in the Last Year: No      Allergies   Allergen Reactions    Cefprozil Hives     Pt states not allergy    Levofloxacin Hives     Pt states not allergy        History reviewed. No pertinent family history.    No current facility-administered medications on file prior to encounter.     Current Outpatient Medications on File Prior to Encounter   Medication Sig Dispense Refill    Prenatal MV-Min-Fe Fum-FA-DHA (PRENATAL 1 PO) Take 1 capsule by mouth daily      NECON 0.5/35, 28, 0.5-35 MG-MCG per tablet            Review of Systems: A comprehensive review of systems was negative except for that written in the History of Present Illness.    Objective:     Vitals:    Patient Vitals for the past 24 hrs:   BP Temp Temp src Pulse Resp SpO2   10/02/24 0440 107/64 -- -- 76 -- --   10/02/24 0047 120/74 -- -- 86 -- 98 %   10/01/24 1941 127/87 98.1 °F (36.7 °C) Oral 98 17 98 %         Physical Exam:  AFVSS  Lungs-CTAB  CV-RRR  ABD-soft, gravid    Membranes:  Intact  Fetal Heart Rate: Reactive          Prenatal Labs:   No results found for: \"LABABO\"       Impression/Plan:   39 weeks gestation of pregnancy   Hospital Problems             Last Modified POA    * (Principal) 39 weeks gestation of pregnancy 10/1/2024 Yes        Plan: Admit for induction of labor. .

## 2024-10-02 NOTE — PROGRESS NOTES
EPIDURAL PLACEMENT      Dr Lyons at bedside at 1205.  LISSETTE Linder at bedside at 1206    Assisted pt to sitting up on bedside at 1208.    Timeout completed at 1212 with LISSETTE ROSENTHAL and myself at bedside.    Test dose given at 1216.  Negative reaction.    Dose given at 1217.    Pt assisted to lying back in left tilt position @ 1220.    See anesthesia record for details.  See vital sign flow sheet for BP.    Tolerated procedure well.     Dr Lyons left room 1220  Kim left room 1221

## 2024-10-03 LAB
C. TRACHOMATIS, EXTERNAL RESULT: NOT DETECTED
GBS, EXTERNAL RESULT: NOT DETECTED
HEP B, EXTERNAL RESULT: NON REACTIVE
HEPATITIS C ANTIBODY, EXTERNAL RESULT: NEGATIVE
HIV, EXTERNAL RESULT: NON REACTIVE
N. GONORRHOEAE, EXTERNAL RESULT: NOT DETECTED
RPR, EXTERNAL RESULT: NON REACTIVE
RUBELLA TITER, EXTERNAL RESULT: NORMAL
RUBV IGG SERPL IA-ACNC: 141 IU/ML
T PALLIDUM AB SER QL IA: NONREACTIVE

## 2024-10-03 PROCEDURE — 6370000000 HC RX 637 (ALT 250 FOR IP): Performed by: OBSTETRICS & GYNECOLOGY

## 2024-10-03 PROCEDURE — 36415 COLL VENOUS BLD VENIPUNCTURE: CPT

## 2024-10-03 PROCEDURE — 86762 RUBELLA ANTIBODY: CPT

## 2024-10-03 PROCEDURE — 1100000000 HC RM PRIVATE

## 2024-10-03 RX ADMIN — FAMOTIDINE 20 MG: 20 TABLET, FILM COATED ORAL at 08:17

## 2024-10-03 RX ADMIN — IBUPROFEN 800 MG: 800 TABLET, FILM COATED ORAL at 23:41

## 2024-10-03 RX ADMIN — ACETAMINOPHEN 1000 MG: 500 TABLET, FILM COATED ORAL at 14:35

## 2024-10-03 RX ADMIN — IBUPROFEN 800 MG: 800 TABLET, FILM COATED ORAL at 04:55

## 2024-10-03 RX ADMIN — POLYETHYLENE GLYCOL 3350 17 G: 17 POWDER, FOR SOLUTION ORAL at 08:16

## 2024-10-03 RX ADMIN — IBUPROFEN 800 MG: 800 TABLET, FILM COATED ORAL at 11:21

## 2024-10-03 RX ADMIN — ACETAMINOPHEN 1000 MG: 500 TABLET, FILM COATED ORAL at 19:43

## 2024-10-03 RX ADMIN — FAMOTIDINE 20 MG: 20 TABLET, FILM COATED ORAL at 19:41

## 2024-10-03 RX ADMIN — ACETAMINOPHEN 1000 MG: 500 TABLET, FILM COATED ORAL at 08:17

## 2024-10-03 RX ADMIN — IBUPROFEN 800 MG: 800 TABLET, FILM COATED ORAL at 17:44

## 2024-10-03 RX ADMIN — ACETAMINOPHEN 1000 MG: 500 TABLET, FILM COATED ORAL at 02:09

## 2024-10-03 NOTE — CARE COORDINATION
Chart reviewed - first time parent.  SW met with patient to complete initial assessment.     provided education on [facility] [place] [facility].  Family was undecided on need for home visit.  No referral will be made at this time.  Family has this 's contact information should they decide to participate in program.    Patient given informational packet on  mood & anxiety disorders (resources/education).    Family denies any additional needs from  at this time.  Family has 's contact information should any needs/questions arise.    Darlene Scott, ELLIE-CP, [facility]  [facility] [place]   [phone]

## 2024-10-03 NOTE — ANESTHESIA POSTPROCEDURE EVALUATION
Department of Anesthesiology  Postprocedure Note    Patient: Radha Fernandes  MRN: [id number]  YOB: 1990  Date of evaluation: 10/3/2024    Procedure Summary       Date: 10/02/24 Room / Location:     Anesthesia Start: 1207 Anesthesia Stop: 1705    Procedure: Labor Analgesia Diagnosis:     Scheduled Providers:  Responsible Provider: Ever Lyons MD    Anesthesia Type: epidural ASA Status: 2            Anesthesia Type: No value filed.    Kali Phase I:      Kali Phase II:      Anesthesia Post Evaluation    Patient location during evaluation: floor  Patient participation: complete - patient participated  Level of consciousness: awake and alert  Airway patency: patent  Nausea: well controlled.  Cardiovascular status: acceptable.  Respiratory status: acceptable  Hydration status: stable  Pain management: adequate    No notable events documented.

## 2024-10-03 NOTE — PROGRESS NOTES
Patient up to bathroom with RN assistance.  Lizabeth-care taught and completed. Questions encouraged and answered.  Patient ambulating without difficulty, encouraged to call for needs or concerns. Verbalizes understanding.

## 2024-10-04 ENCOUNTER — LACTATION ENCOUNTER (OUTPATIENT)
Dept: MOTHER INFANT UNIT | Age: 34
End: 2024-10-04

## 2024-10-04 VITALS
HEART RATE: 72 BPM | TEMPERATURE: 97.9 F | SYSTOLIC BLOOD PRESSURE: 108 MMHG | OXYGEN SATURATION: 97 % | DIASTOLIC BLOOD PRESSURE: 69 MMHG | RESPIRATION RATE: 16 BRPM

## 2024-10-04 PROCEDURE — 6370000000 HC RX 637 (ALT 250 FOR IP): Performed by: OBSTETRICS & GYNECOLOGY

## 2024-10-04 RX ORDER — IBUPROFEN 800 MG/1
800 TABLET, FILM COATED ORAL EVERY 6 HOURS
Qty: 120 TABLET | Refills: 3 | Status: SHIPPED | OUTPATIENT
Start: 2024-10-04

## 2024-10-04 RX ORDER — OXYCODONE HYDROCHLORIDE 5 MG/1
5 TABLET ORAL EVERY 4 HOURS PRN
Qty: 20 TABLET | Refills: 0 | Status: SHIPPED | OUTPATIENT
Start: 2024-10-04 | End: 2024-10-07

## 2024-10-04 RX ADMIN — IBUPROFEN 800 MG: 800 TABLET, FILM COATED ORAL at 12:42

## 2024-10-04 RX ADMIN — ACETAMINOPHEN 1000 MG: 500 TABLET, FILM COATED ORAL at 02:12

## 2024-10-04 RX ADMIN — ACETAMINOPHEN 1000 MG: 500 TABLET, FILM COATED ORAL at 08:48

## 2024-10-04 RX ADMIN — IBUPROFEN 800 MG: 800 TABLET, FILM COATED ORAL at 06:15

## 2024-10-04 RX ADMIN — FAMOTIDINE 20 MG: 20 TABLET, FILM COATED ORAL at 08:48

## 2024-10-04 RX ADMIN — POLYETHYLENE GLYCOL 3350 17 G: 17 POWDER, FOR SOLUTION ORAL at 08:48

## 2024-10-04 NOTE — DISCHARGE SUMMARY
Obstetrical Discharge Summary     Name: April Amarilis Fernandes  MRN: [id number]   SSN: [unfilled]     YOB: 1990   Age: 34 y.o.  Sex: female       Allergies:   Allergies   Allergen Reactions    Cefprozil Hives     Pt states not allergy    Levofloxacin Hives     Pt states not allergy        Admit Date:  10/1/2024     Discharge Date:       Admitting Physician: [unfilled]     Attending Physician:  Naina Dunn MD     * Admission Diagnoses:  39 weeks gestation of pregnancy [Z3A.39]     * Discharge Diagnoses:     Samir Fernandes April [[id number]]      [place] Information    Head delivery date/time: 10/2/2024 17:05:00   Changing the 's delivery date/time could affect patient care.:      Delivery date/time:  10/2/24 1705   Delivery type: Vaginal, Spontaneous    Details:                       * Procedures: No admission procedures for hospital encounter.     * Discharge Condition: Good    * Hospital Course: Normal hospital course following the delivery.    * Disposition: Home    Discharge Medications:      Medication List        START taking these medications      ibuprofen 800 MG tablet  Commonly known as: ADVIL;MOTRIN  Take 1 tablet by mouth every 6 hours     oxyCODONE 5 MG immediate release tablet  Commonly known as: ROXICODONE  Take 1 tablet by mouth every 4 hours as needed for Pain for up to 3 days. Max Daily Amount: 30 mg            CONTINUE taking these medications      PRENATAL 1 PO            STOP taking these medications      Necon 0.5/35 (28) 0.5-35 MG-MCG per tablet  Generic drug: norethindrone-ethinyl estradiol               Where to Get Your Medications        These medications were sent to [facility]/pharmacy #[id number] - [place] [id number] [facility] - P [phone] - F [phone]  [address] [place] [id number]      Phone: [phone]   ibuprofen 800 MG tablet  oxyCODONE 5 MG immediate release tablet          * Follow-up Care/Patient Instructions:  Activity: activity as

## 2024-10-04 NOTE — LACTATION NOTE
This note was copied from a baby's chart.  In to follow up with mom and infant prior to discharge to home. Mom stated that during the night infant was fussy but would not latch and nurse. She pumped and fed expressed colostrum/breastmilk to infant using a straight syringe. Mom stated that it had been 3 hours since infant had been offered the breast. Assisted mom with latch on the left breast in the football hold. Infant latched but would take a couple of sucks and come off. After several attempts infant became consistent and maintained the latch and nursed for several minutes and fell asleep. Mom had 7 ml expressed colostrum and I instructed her on how to feed that to infant with the curve tip syringe. Noted that infant could not left his tongue when crying and when attempting to stick his tongue out over the gum line the tip of his tongue is \"heart shaped\". Reviewed discharge information as well as a feeding plan. Mom and infant has a follow up appointment with our outpatient lactation consultant on Tuesday, October 8th @ 1030.

## 2024-10-04 NOTE — DISCHARGE INSTRUCTIONS
Discharge instruction to follow:   Activity: Pelvis rest for 6 weeks     No heavy lifting over 15 lbs for 2 weeks     No driving for 2 weeks     No push/pull motion such as sweeping or vacuuming for 2 weeks     No tub baths for 6 weeks    If using diaz-bottle continue to use until comfortable stopping.  Change sanitary pad after each urination or bowel movement.    Call MD for the following:      Fever over 101 F; pain not relieved by medication; foul smelling vaginal discharge or an increase in vaginal bleeding.       Take medication as prescribed. Follow up with MD as order.        Vaginal Childbirth: Care Instructions  Overview     Vaginal birth means delivering a baby through the birth canal (vagina). During labor, the uterus tightens (contracts) regularly to thin and open the cervix and to push the baby out through the birth canal.  Your body will slowly heal in the next few weeks. It's easy to get too tired and overwhelmed during the first weeks after your baby is born. Changes in your hormones can shift your mood without warning. You may find it hard to meet the extra demands on your energy and time. Take it easy on yourself.  Follow-up care is a key part of your treatment and safety. Be sure to make and go to all appointments, and call your doctor if you are having problems. It's also a good idea to know your test results and keep a list of the medicines you take.  How can you care for yourself at home?  Vaginal bleeding and cramps  After delivery, you will have a bloody discharge from your vagina. This will turn pink within a week and then white or yellow after about 10 days. It may last for 2 to 4 weeks or longer, until the uterus has healed. Use sanitary pads until you stop bleeding. Using pads makes it easier to monitor your bleeding.  Don't worry if you pass some blood clots, as long as they are smaller than a golf ball. If you have a tear or stitches in your vaginal area, change the pad at least every  hopeless for more than a few days.     You are having problems with your breasts or breastfeeding.   Where can you learn more?  Go to https://www.[employer].net/patientEd and enter Q237 to learn more about \"Vaginal Childbirth: Care Instructions.\"  Current as of: July 10, 2023  Content Version: 14.2  © 2024 [employer].   Care instructions adapted under license by [employer]. If you have questions about a medical condition or this instruction, always ask your healthcare professional. [employer] disclaims any warranty or liability for your use of this information.

## 2024-10-04 NOTE — LACTATION NOTE
This note was copied from a baby's chart.  In to see mom and infant for the first time. Mom stated that infant hasn't been latching even though she has attempted with every feeding. She has also been pumping and feeding infant expressed colostrum with curve tip syringe. Infant was awake and showing feeding cues. Offered to assist with latch. Mom placed infant to her right breast in the cross cradle hold. Infant latched and started to suck rhythmically. Nursed for several minutes and came off the breast content. Mom \"burped\" infant and placed him to her left breast in the same hold. Infant latched and sucked rhythmically for a few minutes and fell asleep. Reviewed the second night of life and answered questions. Lactation consultant will follow up tomorrow.  
This note was copied from a baby's chart.  Mom and baby are going home today.  Continue to offer the breast without restriction.  Mom's milk should be fully in over the next few days.  Reviewed engorgement precautions.  Hand Expression has been demoed and written hand-out reviewed.  As milk comes in baby will be more alert at the breast and swallows will be more obvious.  Breasts may feel softer once baby has finished nursing.  Baby should be back to birth weight by 2 weeks of age.  And then gain on average 1 oz per day for the next 2-3 months.  Reviewed babies should be exclusively breastfeeding for the first 6 months and that breastfeeding should continue after introduction of appropriate complimentary foods after 6 months.  Initial output should be at least 1 wet and 1 bowel movement for each day old baby is.  By day 5-7 once milk is fully in baby will consistently have 6 or more soaking wet diapers and about 4 bowel movement.  Some babies have a bowel movement with every feeding and some have 1-3 large bowel movements each day.  Inadequate output may indicate inadequate feedings and should be reported to your Pediatrician.  Bowel habits may change as baby gets older.  Encouraged follow-up at Pediatrician in 1-2 days, no later than 1 week of age.  Call OP Lactation Center for any questions as needed or to set up an OP visit.  OP phone calls are returned within 24 hours. Community Breastfeeding Resource List given.    
the breast milk you obtain using a straight syringe or  curved syringe.    If baby does NOT have enough wet and dirty diapers per day, is jaundiced/lethargic, or has significant weight loss AND you do NOT pump enough milk for each feeding (per volume listed below), formula supplementation may need to be used. Call lactation department /pediatrician if you have concerns.     AVERAGE INTAKES OF COLOSTRUM BY HEALTHY  INFANTS:  Time  Day Intake (ml per feeding)  Based on 8 feedings per day.  24-48 hrs  2 5-15 ml (Based in every 3 hour feeds)  48-72 hrs  3 15-30 ml (0.5-1 oz)  72-96 hrs  4 30-45 ml (1-1.5oz)                          5-6      45-60 ml (1.5-2oz)                           7        60-75 ml  (2-2.5oz)      By day 7, baby will need 71 ml or 2.5 oz at each feeding based on 8 feedings per day & baby’s weight. (1oz = 30ml).  Total milk volume needed in 24 hours by Day 7 is 19 oz per day based on baby's birthweight of 7 lbs 2oz. The more often baby eats, the less volume they need per feeding.  If baby is eating more often than the minimum of 8 times per day, they may take less per feeding.    If pumping, suggest using olive oil or coconut oil on your nipples before pumping to help reduce the friction.    Use feeding plan until follow up with pediatrician. Continue to attempt at the breast for most feeds. Pump every 3 hours if no latch. Give all pumped colostrum/breastmilk at each feeding. OUTPATIENT APPOINTMENT Suggested.  Outpatient services are located on the 4th floor at [facility]. Check in at the 4th floor registration desk (the same one you used when you came to have your baby).  Call for questions [phone]

## 2024-10-08 ENCOUNTER — HOSPITAL ENCOUNTER (OUTPATIENT)
Dept: LACTATION | Age: 34
Discharge: HOME | End: 2024-10-11

## 2024-10-08 NOTE — PROGRESS NOTES
10/8/2024  Re: (Sánchez Fernandes  [birth year])    Dear Dr. Wesley,     I saw Sánchez and his mother April in our Lactation office today.  Mom came in today to get assistance with painful breastfeeding.     Date Weight Comments   10-2-24 7-2 Birthweight   10-4-24 6-11.5 Discharge Weight   10-7-24 6-11 At Ped   10-8-24 6-11.4/6-12 Naked At [facility] OP Lactation / Dry Diaper     Estimated Needs:  By tomorrow at 1 week, baby needs 17-19 oz of breast milk/formula per day based on 8 feedings per day.  Baby needs 60-75 ml/2-2.5 oz of breast milk/formula per feeding.  The more often baby eats the less volume they need per feeding.     Date Side Position Time Before Wt. After Wt Total   10-8-24 R Laid back 1058 20 min 3060 3088 28 ml    L   1122 10 min  3116 28 ml  Total: 56 ml ~2 oz     Baby Sánchez was alert and ready to nurse.  Mom came in today due to continued soreness with breastfeeding. Noted Ped diagnosed tongue tie and has send ENT a referral for release.  Assisted with laid back positioning.  Sánchez was a bit fussy at first, but once milk flow started, he was able to stay latched fairly well with swallows.  Mom feels positioning is more comfortable.  Nipple mostly round on release.  Sánchez took a full volume feeing at breast, but mom stated this was one of his better feedings.  Discussed consistency at all feedings may take some time.  Can pump and bottle as desired.      Plan:  Continue with on-demand feedings.  Can choose to pump and bottle for a feeding as needed for a soreness break.  If feeding at breast does not go well on both sides, pump and offer 30-45 ml. Follow efficiency and improvement of symptoms post tongue tie release.      Follow-up:  To Ped 10-11-24 (circ).  Will call Friday 10-11-24.  Call as needed before.    With mom's permission will email copy of today's visit with pt. identifiers removed.     Sincerely,      Dyana Borrero, MS, RD, IBCLC  [phone]    
post tongue tie release.      Follow-up:  To Ped 10-11-24 (circ).  Will call Friday 10-11-24.  Call as needed before.    With mom's permission will email copy of today's visit with pt. identifiers removed.

## 2024-10-11 ENCOUNTER — TELEPHONE (OUTPATIENT)
Dept: LACTATION | Age: 34
End: 2024-10-11

## 2024-10-11 NOTE — TELEPHONE ENCOUNTER
Tongue clipped by ENT on 10-9-24.  Nursing feels better and weight was 7-2 today at Ped.  Will call as needed.

## 2025-04-18 DIAGNOSIS — Z00.00 LABORATORY EXAMINATION ORDERED AS PART OF A ROUTINE GENERAL MEDICAL EXAMINATION: ICD-10-CM

## 2025-04-18 DIAGNOSIS — E78.00 PURE HYPERCHOLESTEROLEMIA: Primary | ICD-10-CM

## 2025-04-28 ENCOUNTER — LAB (OUTPATIENT)
Dept: FAMILY MEDICINE CLINIC | Facility: CLINIC | Age: 35
End: 2025-04-28

## 2025-04-28 DIAGNOSIS — Z00.00 LABORATORY EXAMINATION ORDERED AS PART OF A ROUTINE GENERAL MEDICAL EXAMINATION: ICD-10-CM

## 2025-04-28 DIAGNOSIS — E78.00 PURE HYPERCHOLESTEROLEMIA: ICD-10-CM

## 2025-04-28 LAB
ALBUMIN SERPL-MCNC: 3.8 G/DL (ref 3.5–5)
ALBUMIN/GLOB SERPL: 1.2 (ref 1–1.9)
ALP SERPL-CCNC: 65 U/L (ref 35–104)
ALT SERPL-CCNC: 17 U/L (ref 8–45)
ANION GAP SERPL CALC-SCNC: 10 MMOL/L (ref 7–16)
AST SERPL-CCNC: 16 U/L (ref 15–37)
BASOPHILS # BLD: 0.03 K/UL (ref 0–0.2)
BASOPHILS NFR BLD: 0.7 % (ref 0–2)
BILIRUB SERPL-MCNC: 0.3 MG/DL (ref 0–1.2)
BUN SERPL-MCNC: 11 MG/DL (ref 6–23)
CALCIUM SERPL-MCNC: 9.3 MG/DL (ref 8.8–10.2)
CHLORIDE SERPL-SCNC: 103 MMOL/L (ref 98–107)
CHOLEST SERPL-MCNC: 283 MG/DL (ref 0–200)
CO2 SERPL-SCNC: 24 MMOL/L (ref 20–29)
CREAT SERPL-MCNC: 0.72 MG/DL (ref 0.6–1.1)
DIFFERENTIAL METHOD BLD: ABNORMAL
EOSINOPHIL # BLD: 0.19 K/UL (ref 0–0.8)
EOSINOPHIL NFR BLD: 4.6 % (ref 0.5–7.8)
ERYTHROCYTE [DISTWIDTH] IN BLOOD BY AUTOMATED COUNT: 11.9 % (ref 11.9–14.6)
GLOBULIN SER CALC-MCNC: 3.2 G/DL (ref 2.3–3.5)
GLUCOSE SERPL-MCNC: 96 MG/DL (ref 70–99)
HCT VFR BLD AUTO: 46 % (ref 35.8–46.3)
HDLC SERPL-MCNC: 83 MG/DL (ref 40–60)
HDLC SERPL: 3.4 (ref 0–5)
HGB BLD-MCNC: 14.9 G/DL (ref 11.7–15.4)
IMM GRANULOCYTES # BLD AUTO: 0.01 K/UL (ref 0–0.5)
IMM GRANULOCYTES NFR BLD AUTO: 0.2 % (ref 0–5)
LDLC SERPL CALC-MCNC: 177 MG/DL (ref 0–100)
LYMPHOCYTES # BLD: 1.15 K/UL (ref 0.5–4.6)
LYMPHOCYTES NFR BLD: 27.7 % (ref 13–44)
MCH RBC QN AUTO: 31.2 PG (ref 26.1–32.9)
MCHC RBC AUTO-ENTMCNC: 32.4 G/DL (ref 31.4–35)
MCV RBC AUTO: 96.2 FL (ref 82–102)
MONOCYTES # BLD: 0.38 K/UL (ref 0.1–1.3)
MONOCYTES NFR BLD: 9.2 % (ref 4–12)
NEUTS SEG # BLD: 2.39 K/UL (ref 1.7–8.2)
NEUTS SEG NFR BLD: 57.6 % (ref 43–78)
NRBC # BLD: 0 K/UL (ref 0–0.2)
PLATELET # BLD AUTO: 230 K/UL (ref 150–450)
PMV BLD AUTO: 10.3 FL (ref 9.4–12.3)
POTASSIUM SERPL-SCNC: 5 MMOL/L (ref 3.5–5.1)
PROT SERPL-MCNC: 7 G/DL (ref 6.3–8.2)
RBC # BLD AUTO: 4.78 M/UL (ref 4.05–5.2)
SODIUM SERPL-SCNC: 137 MMOL/L (ref 136–145)
TRIGL SERPL-MCNC: 116 MG/DL (ref 0–150)
TSH, 3RD GENERATION: 0.89 UIU/ML (ref 0.27–4.2)
VLDLC SERPL CALC-MCNC: 23 MG/DL (ref 6–23)
WBC # BLD AUTO: 4.2 K/UL (ref 4.3–11.1)

## 2025-05-02 ASSESSMENT — PATIENT HEALTH QUESTIONNAIRE - PHQ9
10. IF YOU CHECKED OFF ANY PROBLEMS, HOW DIFFICULT HAVE THESE PROBLEMS MADE IT FOR YOU TO DO YOUR WORK, TAKE CARE OF THINGS AT HOME, OR GET ALONG WITH OTHER PEOPLE: SOMEWHAT DIFFICULT
9. THOUGHTS THAT YOU WOULD BE BETTER OFF DEAD, OR OF HURTING YOURSELF: NOT AT ALL
SUM OF ALL RESPONSES TO PHQ QUESTIONS 1-9: 2
SUM OF ALL RESPONSES TO PHQ QUESTIONS 1-9: 2
7. TROUBLE CONCENTRATING ON THINGS, SUCH AS READING THE NEWSPAPER OR WATCHING TELEVISION: SEVERAL DAYS
6. FEELING BAD ABOUT YOURSELF - OR THAT YOU ARE A FAILURE OR HAVE LET YOURSELF OR YOUR FAMILY DOWN: NOT AT ALL
10. IF YOU CHECKED OFF ANY PROBLEMS, HOW DIFFICULT HAVE THESE PROBLEMS MADE IT FOR YOU TO DO YOUR WORK, TAKE CARE OF THINGS AT HOME, OR GET ALONG WITH OTHER PEOPLE: SOMEWHAT DIFFICULT
1. LITTLE INTEREST OR PLEASURE IN DOING THINGS: NOT AT ALL
4. FEELING TIRED OR HAVING LITTLE ENERGY: SEVERAL DAYS
8. MOVING OR SPEAKING SO SLOWLY THAT OTHER PEOPLE COULD HAVE NOTICED. OR THE OPPOSITE - BEING SO FIDGETY OR RESTLESS THAT YOU HAVE BEEN MOVING AROUND A LOT MORE THAN USUAL: NOT AT ALL
3. TROUBLE FALLING OR STAYING ASLEEP: NOT AT ALL
8. MOVING OR SPEAKING SO SLOWLY THAT OTHER PEOPLE COULD HAVE NOTICED. OR THE OPPOSITE, BEING SO FIGETY OR RESTLESS THAT YOU HAVE BEEN MOVING AROUND A LOT MORE THAN USUAL: NOT AT ALL
2. FEELING DOWN, DEPRESSED OR HOPELESS: NOT AT ALL
SUM OF ALL RESPONSES TO PHQ QUESTIONS 1-9: 2
1. LITTLE INTEREST OR PLEASURE IN DOING THINGS: NOT AT ALL
9. THOUGHTS THAT YOU WOULD BE BETTER OFF DEAD, OR OF HURTING YOURSELF: NOT AT ALL
SUM OF ALL RESPONSES TO PHQ QUESTIONS 1-9: 2
6. FEELING BAD ABOUT YOURSELF - OR THAT YOU ARE A FAILURE OR HAVE LET YOURSELF OR YOUR FAMILY DOWN: NOT AT ALL
SUM OF ALL RESPONSES TO PHQ QUESTIONS 1-9: 2
2. FEELING DOWN, DEPRESSED OR HOPELESS: NOT AT ALL
5. POOR APPETITE OR OVEREATING: NOT AT ALL
5. POOR APPETITE OR OVEREATING: NOT AT ALL
4. FEELING TIRED OR HAVING LITTLE ENERGY: SEVERAL DAYS
3. TROUBLE FALLING OR STAYING ASLEEP: NOT AT ALL
7. TROUBLE CONCENTRATING ON THINGS, SUCH AS READING THE NEWSPAPER OR WATCHING TELEVISION: SEVERAL DAYS

## 2025-05-05 ENCOUNTER — OFFICE VISIT (OUTPATIENT)
Dept: FAMILY MEDICINE CLINIC | Facility: CLINIC | Age: 35
End: 2025-05-05
Payer: COMMERCIAL

## 2025-05-05 VITALS
BODY MASS INDEX: 28.26 KG/M2 | DIASTOLIC BLOOD PRESSURE: 68 MMHG | OXYGEN SATURATION: 100 % | WEIGHT: 153.6 LBS | SYSTOLIC BLOOD PRESSURE: 110 MMHG | HEART RATE: 99 BPM | HEIGHT: 62 IN

## 2025-05-05 DIAGNOSIS — F41.9 ANXIETY: ICD-10-CM

## 2025-05-05 DIAGNOSIS — Z00.00 ROUTINE GENERAL MEDICAL EXAMINATION AT A HEALTH CARE FACILITY: Primary | ICD-10-CM

## 2025-05-05 DIAGNOSIS — F33.9 RECURRENT DEPRESSION: ICD-10-CM

## 2025-05-05 DIAGNOSIS — E78.00 PURE HYPERCHOLESTEROLEMIA: ICD-10-CM

## 2025-05-05 PROCEDURE — 99395 PREV VISIT EST AGE 18-39: CPT | Performed by: FAMILY MEDICINE

## 2025-05-05 RX ORDER — NORETHINDRONE AND ETHINYL ESTRADIOL 0.5-0.035
1 KIT ORAL DAILY
COMMUNITY
Start: 2025-02-06

## 2025-05-05 NOTE — PROGRESS NOTES
Have you been to the ER, urgent care clinic since your last visit?  Hospitalized since your last visit?   YES - When: approximately 7 months ago.  Where and Why: [facility] baby delivery.    Have you seen or consulted any other health care providers outside our system since your last visit?   NO     “Have you had a pap smear?”    NO    No cervical cancer screening on file

## 2025-05-05 NOTE — PROGRESS NOTES
SUBJECTIVE:   Radha Fenrandes is a 35 y.o. female presents today for a CPX.  Patient has a past medical history significant for high cholesterol, mild anxiety and recurrent depression.  Currently on birth control but no other prescription medicine.  Since I last seen her she has had a baby.  Her son is doing very well.  He is 7 months old.  She is currently not breast-feeding.    HPI  See above    Past Medical History, Past Surgical History, Family history, Social History, and Medications were all reviewed with the patient today and updated as necessary.       Current Outpatient Medications   Medication Sig Dispense Refill    NECON 0.5/35, 28, 0.5-35 MG-MCG per tablet Take 1 tablet by mouth daily       No current facility-administered medications for this visit.     Allergies   Allergen Reactions    Cefprozil Hives     Pt states not allergy    Levofloxacin Hives     Pt states not allergy     Patient Active Problem List   Diagnosis    39 weeks gestation of pregnancy     Past Medical History:   Diagnosis Date    Anxiety     Depression     Insomnia      Past Surgical History:   Procedure Laterality Date    FOOT SURGERY  08/2020    TONSILLECTOMY AND ADENOIDECTOMY      17yo    WISDOM TOOTH EXTRACTION      20yo     Family History   Problem Relation Age of Onset    Breast Cancer Mother     Diabetes Father      Social History     Tobacco Use    Smoking status: Never    Smokeless tobacco: Never   Substance Use Topics    Alcohol use: Yes     Alcohol/week: 0.0 standard drinks of alcohol         Review of Systems  See above    OBJECTIVE:  /68   Pulse 99   Ht 1.575 m (5' 2\")   Wt 69.7 kg (153 lb 9.6 oz)   SpO2 100%   BMI 28.09 kg/m²      Physical Exam  Constitutional:       General: She is not in acute distress.     Appearance: Normal appearance. She is not ill-appearing.   HENT:      Head: Normocephalic and atraumatic.      Right Ear: Tympanic membrane, ear canal and external ear normal. There is no impacted